# Patient Record
Sex: FEMALE
[De-identification: names, ages, dates, MRNs, and addresses within clinical notes are randomized per-mention and may not be internally consistent; named-entity substitution may affect disease eponyms.]

---

## 2021-01-01 ENCOUNTER — OFFICE VISIT (OUTPATIENT)
Age: 61
End: 2021-01-01

## 2022-07-07 ENCOUNTER — OFFICE VISIT (OUTPATIENT)
Dept: URBAN - METROPOLITAN AREA CLINIC 9 | Facility: CLINIC | Age: 62
End: 2022-07-07

## 2022-07-30 ENCOUNTER — TELEPHONE ENCOUNTER (OUTPATIENT)
Age: 62
End: 2022-07-30

## 2022-07-31 ENCOUNTER — TELEPHONE ENCOUNTER (OUTPATIENT)
Age: 62
End: 2022-07-31

## 2022-08-18 ENCOUNTER — OFFICE VISIT (OUTPATIENT)
Dept: URBAN - METROPOLITAN AREA CLINIC 9 | Facility: CLINIC | Age: 62
End: 2022-08-18

## 2022-08-31 ENCOUNTER — WEB ENCOUNTER (OUTPATIENT)
Dept: URBAN - METROPOLITAN AREA CLINIC 9 | Facility: CLINIC | Age: 62
End: 2022-08-31

## 2022-08-31 ENCOUNTER — CLAIMS CREATED FROM THE CLAIM WINDOW (OUTPATIENT)
Dept: URBAN - METROPOLITAN AREA CLINIC 9 | Facility: CLINIC | Age: 62
End: 2022-08-31
Payer: OTHER GOVERNMENT

## 2022-08-31 VITALS
WEIGHT: 153 LBS | BODY MASS INDEX: 23.19 KG/M2 | DIASTOLIC BLOOD PRESSURE: 60 MMHG | SYSTOLIC BLOOD PRESSURE: 130 MMHG | HEIGHT: 68 IN

## 2022-08-31 DIAGNOSIS — R10.10 LOWER ABDOMINAL PAIN: ICD-10-CM

## 2022-08-31 DIAGNOSIS — K59.09 CONSTIPATION: ICD-10-CM

## 2022-08-31 DIAGNOSIS — R10.30 LOWER ABDOMINAL PAIN: ICD-10-CM

## 2022-08-31 DIAGNOSIS — R10.2 LOWER ABDOMINAL PAIN: ICD-10-CM

## 2022-08-31 DIAGNOSIS — R10.9 ABDOMINAL PAIN: ICD-10-CM

## 2022-08-31 DIAGNOSIS — K76.0 FATTY LIVER: ICD-10-CM

## 2022-08-31 DIAGNOSIS — Z71.9 ENCOUNTER FOR PT PORTAL EDUCATION: ICD-10-CM

## 2022-08-31 DIAGNOSIS — K76.89 FATTY LIVER: ICD-10-CM

## 2022-08-31 PROCEDURE — 99214 OFFICE O/P EST MOD 30 MIN: CPT | Performed by: INTERNAL MEDICINE

## 2022-08-31 RX ORDER — LEVOTHYROXINE SODIUM 112 UG/1
1 TABLET IN THE MORNING ON AN EMPTY STOMACH TABLET ORAL ONCE A DAY
Status: ACTIVE | COMMUNITY

## 2022-08-31 NOTE — HPI-TODAY'S VISIT:
62 year old female here for f/u. We last saw her about 6 weeks ago At last visit: 62-year-old female here for right abdominal pain. She says this began about 2 years ago. She started getting this very focal right upper quadrant pain. There is no obvious trigger. 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 It last for a second. It very intermittent. Is not daily. Should a colonoscopy several years ago with one small polyp. No family history of GI issues or malignancies. Weight is stable no weight loss. She has chronic constipation for years and there is been no change in this. She will go to the bathroom a be every other day At lat visit, we didnt feel her pain was gi related , we felt it was MSK and adviced lidocaine patches and could consider abd wall injection. Also put her on benefiber bid We did get a ct scan which just showed fatty liver.    She comes in for follow-up today.  She denies really anything but occasional drinking no family history of liver problems.  Her abdominal pain that she came in is very sporadic and mainly with vacuuming.  She has not tried the lidocaine patches yet.

## 2022-09-27 ENCOUNTER — WEB ENCOUNTER (OUTPATIENT)
Dept: URBAN - METROPOLITAN AREA CLINIC 9 | Facility: CLINIC | Age: 62
End: 2022-09-27

## 2022-10-05 ENCOUNTER — OFFICE VISIT (OUTPATIENT)
Dept: URBAN - METROPOLITAN AREA CLINIC 9 | Facility: CLINIC | Age: 62
End: 2022-10-05

## 2022-10-05 ENCOUNTER — WEB ENCOUNTER (OUTPATIENT)
Dept: URBAN - METROPOLITAN AREA TELEHEALTH 1 | Facility: TELEHEALTH | Age: 62
End: 2022-10-05

## 2022-10-05 NOTE — HPI-TODAY'S VISIT:
62 year old female here for f/u. We last saw her about 6 weeks ago At last visit: 62-year-old female here for right abdominal pain. She says this began about 2 years ago. She started getting this very focal right upper quadrant pain. There is no obvious trigger. 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 It last for a second. It very intermittent. Is not daily. Should a colonoscopy several years ago with one small polyp. No family history of GI issues or malignancies. Weight is stable no weight loss. She has chronic constipation for years and there is been no change in this. She will go to the bathroom a be every other day At lat visit, we didnt feel her pain was gi related , we felt it was MSK and adviced lidocaine patches and could consider abd wall injection. Also put her on benefiber bid We did get a ct scan which just showed fatty liver.    She comes in for follow-up today.  She denies really anything but occasional drinking no family history of liver problems.  Her abdominal pain that she came in is very sporadic and mainly with vacuuming.  She has not tried the lidocaine patches yet. At last visit, we recommended lidocaine patches for presumed msk pain and ordered cmp and fibroscna

## 2022-11-16 ENCOUNTER — DASHBOARD ENCOUNTERS (OUTPATIENT)
Age: 62
End: 2022-11-16

## 2022-11-16 ENCOUNTER — OFFICE VISIT (OUTPATIENT)
Dept: URBAN - METROPOLITAN AREA CLINIC 9 | Facility: CLINIC | Age: 62
End: 2022-11-16
Payer: OTHER GOVERNMENT

## 2022-11-16 VITALS
DIASTOLIC BLOOD PRESSURE: 68 MMHG | HEIGHT: 68 IN | BODY MASS INDEX: 23.34 KG/M2 | WEIGHT: 154 LBS | SYSTOLIC BLOOD PRESSURE: 100 MMHG

## 2022-11-16 DIAGNOSIS — K76.0 FATTY LIVER: ICD-10-CM

## 2022-11-16 DIAGNOSIS — K59.09 CONSTIPATION: ICD-10-CM

## 2022-11-16 DIAGNOSIS — Z71.9 ENCOUNTER FOR PT PORTAL EDUCATION: ICD-10-CM

## 2022-11-16 DIAGNOSIS — R10.30 LOWER ABDOMINAL PAIN: ICD-10-CM

## 2022-11-16 DIAGNOSIS — R10.9 ABDOMINAL PAIN: ICD-10-CM

## 2022-11-16 PROCEDURE — 99214 OFFICE O/P EST MOD 30 MIN: CPT | Performed by: INTERNAL MEDICINE

## 2022-11-16 RX ORDER — LEVOTHYROXINE SODIUM 112 UG/1
1 TABLET IN THE MORNING ON AN EMPTY STOMACH TABLET ORAL ONCE A DAY
Status: ACTIVE | COMMUNITY

## 2022-11-16 NOTE — HPI-TODAY'S VISIT:
62 year old female here for f/u. We last saw her about 6 weeks ago At last visit: 62-year-old female here for right abdominal pain. She says this began about 2 years ago. She started getting this very focal right upper quadrant pain. There is no obvious trigger. 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 It last for a second. It very intermittent. Is not daily. Should a colonoscopy several years ago with one small polyp. No family history of GI issues or malignancies. Weight is stable no weight loss. She has chronic constipation for years and there is been no change in this. She will go to the bathroom a be every other day At lat visit, we didnt feel her pain was gi related , we felt it was MSK and adviced lidocaine patches and could consider abd wall injection. Also put her on benefiber bid We did get a ct scan which just showed fatty liver.    She comes in for follow-up today.  She denies really anything but occasional drinking no family history of liver problems.  Her abdominal pain that she came in is very sporadic and mainly with vacuuming.  She has not tried the lidocaine patches yet. At last visit, we recommended lidocaine patches for presumed msk pain and ordered cmp and fibroscna CMP was normal.  We got her fibro-which showed no fat or fibrosis She comes in for follow-up today.  She is doing great.  She has not yet tried the lidocaine patches for this abdominal pain

## 2022-11-18 PROBLEM — 197321007: Status: ACTIVE | Noted: 2022-11-18
